# Patient Record
Sex: FEMALE | Employment: UNEMPLOYED | URBAN - METROPOLITAN AREA
[De-identification: names, ages, dates, MRNs, and addresses within clinical notes are randomized per-mention and may not be internally consistent; named-entity substitution may affect disease eponyms.]

---

## 2017-02-15 ENCOUNTER — OFFICE VISIT (OUTPATIENT)
Dept: FAMILY MEDICINE CLINIC | Age: 37
End: 2017-02-15

## 2017-02-15 VITALS
SYSTOLIC BLOOD PRESSURE: 128 MMHG | DIASTOLIC BLOOD PRESSURE: 88 MMHG | HEART RATE: 115 BPM | HEIGHT: 72 IN | BODY MASS INDEX: 33.81 KG/M2 | RESPIRATION RATE: 18 BRPM | WEIGHT: 249.6 LBS | OXYGEN SATURATION: 96 % | TEMPERATURE: 98.2 F

## 2017-02-15 DIAGNOSIS — Z51.81 MEDICATION MONITORING ENCOUNTER: ICD-10-CM

## 2017-02-15 DIAGNOSIS — M79.7 FIBROMYALGIA: ICD-10-CM

## 2017-02-15 DIAGNOSIS — F41.9 ANXIETY: ICD-10-CM

## 2017-02-15 DIAGNOSIS — J45.41 MODERATE PERSISTENT ASTHMA WITH ACUTE EXACERBATION: Primary | ICD-10-CM

## 2017-02-15 LAB
AMPHETAMINE QL URINE POC: NEGATIVE
BARBITURATES UR POC: NEGATIVE
BENZODIAZEPINES UR POC: NORMAL
COCAINE QL URINE POC: NEGATIVE
LOT EXP DATE POC: NORMAL
LOT NUMBER POC: NORMAL
MARIJUANA (THC) QL URINE POC: NEGATIVE
MDMA/ECSTASY UR POC: NEGATIVE
METHADONE QL URINE POC: NEGATIVE
METHAMPHETAMINE QL URINE POC: NEGATIVE
OPIATES QL URINE POC: NORMAL
OXYCODONE UR POC: NORMAL
VALID INTERNAL CONTROL?: YES

## 2017-02-15 RX ORDER — ALBUTEROL SULFATE 90 UG/1
AEROSOL, METERED RESPIRATORY (INHALATION)
COMMUNITY
End: 2021-03-31

## 2017-02-15 RX ORDER — ALBUTEROL SULFATE 0.83 MG/ML
2.5 SOLUTION RESPIRATORY (INHALATION)
Qty: 24 EACH | Refills: 5 | Status: SHIPPED | OUTPATIENT
Start: 2017-02-15 | End: 2021-03-31

## 2017-02-15 RX ORDER — QUETIAPINE FUMARATE 400 MG/1
500 TABLET, FILM COATED ORAL
COMMUNITY
End: 2021-03-31

## 2017-02-15 RX ORDER — FLUTICASONE PROPIONATE AND SALMETEROL 500; 50 UG/1; UG/1
1 POWDER RESPIRATORY (INHALATION) EVERY 12 HOURS
COMMUNITY
End: 2021-03-31

## 2017-02-15 RX ORDER — QUETIAPINE FUMARATE 200 MG/1
100 TABLET, FILM COATED ORAL 2 TIMES DAILY
COMMUNITY
End: 2021-03-31

## 2017-02-15 RX ORDER — LORAZEPAM 1 MG/1
TABLET ORAL
Qty: 17 TAB | Refills: 0 | Status: SHIPPED | OUTPATIENT
Start: 2017-02-15 | End: 2021-03-31

## 2017-02-15 RX ORDER — LORAZEPAM 2 MG/1
TABLET ORAL
COMMUNITY
End: 2017-02-15

## 2017-02-15 RX ORDER — LIDOCAINE 50 MG/G
PATCH TOPICAL EVERY 24 HOURS
COMMUNITY
End: 2021-03-31

## 2017-02-15 RX ORDER — DOXEPIN HYDROCHLORIDE 50 MG/1
CAPSULE ORAL
COMMUNITY
End: 2021-03-31

## 2017-02-15 NOTE — PROGRESS NOTES
Maria D Nelson is a 39 y.o. female   Chief Complaint   Patient presents with    Establish Care    Wheezing    pt here to establish care. Pt reports wheezing and has a hx of asthyma. Pt currently using ventolin and advair daily. Pt has been needing the ventolin more often used it 6x yesterday. Pt states she used the ventolin approx 1 hr ago. Pt reports getting flares of her asthma at least 1x a month. Pt usually uses neb solution when she has an exac but is out and needs refills. Pt also on ativan 2mg tid and oxy 15mg PO Q4 for patellofemoral syndrome and fibro. Pt has no records with her.  checked and pt has been using same clinician mostly for fills, last Rx for ativan was given by psych and states this was when she was in [facility] for 2 weeks,prior ativan Rx was for 1mg tid. That is the only abnormality. Utox is pos for benzo, opiate and oxy. Spoke with prior treating physician Dr Alicia Perez and states she was negative for benzo multiple times and was discharged from the practice for this. Alos rpeorts that he was trying to decrease her pain meds and pt was being very difficult about this and insisting on staying on current regimen. Chief Complaint   Patient presents with   [address] Wheezing     she is a 39y.o. year old female who presents for evalution. Reviewed PmHx, RxHx, FmHx, SocHx, AllgHx and updated and dated in the chart. Review of Systems - negative except as listed above in the HPI    Objective:     Vitals:    02/15/17 0830   BP: 128/88   Pulse: (!) 115   Resp: 18   Temp: 98.2 °F (36.8 °C)   TempSrc: Oral   SpO2: 96%   Weight: 249 lb 9.6 oz (113.2 kg)   Height: 5' 11.5\" (1.816 m)       Current Outpatient Prescriptions   Medication Sig    doxepin (SINEQUAN) 50 mg capsule Take  by mouth nightly.  QUEtiapine (SEROQUEL) 200 mg tablet Take 200 mg by mouth two (2) times a day.  QUEtiapine (SEROQUEL) 400 mg tablet Take 400 mg by mouth two (2) times a day.     albuterol (PROVENTIL HFA, VENTOLIN HFA, PROAIR HFA) 90 mcg/actuation inhaler Take  by inhalation.  fluticasone-salmeterol (ADVAIR DISKUS) 500-50 mcg/dose diskus inhaler Take 1 Puff by inhalation every twelve (12) hours.  lidocaine (LIDODERM) 5 % by TransDERmal route every twenty-four (24) hours. Apply patch to the affected area for 12 hours a day and remove for 12 hours a day.  albuterol (PROVENTIL VENTOLIN) 2.5 mg /3 mL (0.083 %) nebulizer solution 3 mL by Nebulization route every four (4) hours as needed for Wheezing. 493.92 ICD10    LORazepam (ATIVAN) 1 mg tablet 1 tab Po tid prn withdrawal symptoms x 2 days, 1 tab Po bid x 3 days prn withdrawal symptoms, then 0.5 tab PO bid x 3 days prn withdrawal symptoms, then 0.5 tab daily x 4 days prn withdrawal symptoms.  oxyCODONE IR (OXY-IR) 15 mg immediate release tablet     zolpidem (AMBIEN) 10 mg tablet     omeprazole (PRILOSEC) 10 mg capsule Take 10 mg by mouth daily.  levonorgestrel-ethinyl estradiol (ALTAVERA, 28,) 0.15-0.03 mg per tablet 1 pill daily, active pills only.  clonazePAM (KLONOPIN) 0.5 mg tablet     mirtazapine (REMERON) 15 mg tablet     promethazine (PHENERGAN) 6.25 mg/5 mL syrup     valACYclovir (VALTREX) 500 mg tablet     lisinopril (PRINIVIL, ZESTRIL) 10 mg tablet Take  by mouth daily. No current facility-administered medications for this visit.         Physical Examination: General appearance - alert, well appearing, and in no distress, does not appear in pain  Mental status - alert, oriented to person, place, and time  Eyes - pupils equal and reactive, extraocular eye movements intact  Chest - clear to auscultation, no wheezes, rales or rhonchi, symmetric air entry  Heart - normal rate, regular rhythm, normal S1, S2, no murmurs, rubs, clicks or gallops  Extremities - peripheral pulses normal, no pedal edema, no clubbing or cyanosis  Skin - normal coloration and turgor, no rashes, no suspicious skin lesions noted Assessment/ Plan:   Indu was seen today for establish care and wheezing. Diagnoses and all orders for this visit:    Moderate persistent asthma with acute exacerbation  -     albuterol (PROVENTIL VENTOLIN) 2.5 mg /3 mL (0.083 %) nebulizer solution; 3 mL by Nebulization route every four (4) hours as needed for Wheezing. 493.92 ICD10    Anxiety  -     LORazepam (ATIVAN) 1 mg tablet; 1 tab Po tid prn withdrawal symptoms x 2 days, 1 tab Po bid x 3 days prn withdrawal symptoms, then 0.5 tab PO bid x 3 days prn withdrawal symptoms, then 0.5 tab daily x 4 days prn withdrawal symptoms. Medication monitoring encounter  -     AMB POC ALESURJIT ICUP DX DRUG SCREEN 10    Fibromyalgia  -     REFERRAL TO PAIN MANAGEMENT     discussed s/s associated with ativan withdrawal and advised to go to ED if symoptoms worsening not improving. For acute detox. Pt advised that I will not be Rx'ing her narcotics recommended f/u with psych for her anxiety  Follow-up Disposition:  Return if symptoms worsen or fail to improve. I have discussed the diagnosis with the patient and the intended plan as seen in the above orders. The patient has received an after-visit summary and questions were answered concerning future plans. Pt conveyed understanding of plan. Medication Side Effects and Warnings were discussed with patient      Janann Gitelman Lobb, DO   Over 50% of the 45 minutes face to face with Indu Kenia Castillo consisted of counseling and/or discussing treatment plans in reference to her controlled meds and medical issues.

## 2017-02-15 NOTE — MR AVS SNAPSHOT
Visit Information Date & Time Provider Department Dept. Phone Encounter #  
 2/15/2017  8:30 AM Rosyjean Dixie, [address] [phone] [id number] Follow-up Instructions Return if symptoms worsen or fail to improve. Upcoming Health Maintenance Date Due DTaP/Tdap/Td series (1 - Tdap) 9/9/2000 INFLUENZA AGE 9 TO ADULT 8/1/2016 PAP AKA CERVICAL CYTOLOGY 11/25/2018 Allergies as of 2/15/2017  Review Complete On: 2/15/2017 By: Patricia Colin DO Severity Noted Reaction Type Reactions Nsaids (Non-steroidal Anti-inflammatory Drug)  02/15/2017    Other (comments) Causes ulcers- states she was told by GI to list as allergy Current Immunizations  Never Reviewed No immunizations on file. Not reviewed this visit You Were Diagnosed With   
  
 Codes Comments Moderate persistent asthma with acute exacerbation    -  Primary ICD-10-CM: J45.41 
ICD-9-CM: 493.92 Anxiety     ICD-10-CM: F41.9 ICD-9-CM: 300.00 Medication monitoring encounter     ICD-10-CM: Z51.81 
ICD-9-CM: V58.83 Fibromyalgia     ICD-10-CM: M79.7 ICD-9-CM: 729.1 Vitals BP Pulse Temp Resp Height(growth percentile) Weight(growth percentile) 128/88 (!) 115 98.2 °F (36.8 °C) (Oral) 18 5' 11.5\" (1.816 m) 249 lb 9.6 oz (113.2 kg) LMP SpO2 BMI OB Status Smoking Status 01/15/2017 (Approximate) 96% 34.33 kg/m2 Having regular periods Former Smoker Vitals History BMI and BSA Data Body Mass Index Body Surface Area  
 34.33 kg/m 2 2.39 m 2 Preferred Pharmacy Pharmacy Name Phone [id number], [address] [phone] Your Updated Medication List  
  
   
This list is accurate as of: 2/15/17  9:29 AM.  Always use your most recent med list.  
  
  
  
  
 ADVAIR DISKUS 500-50 mcg/dose diskus inhaler Generic drug:  fluticasone-salmeterol Take 1 Puff by inhalation every twelve (12) hours. * albuterol 90 mcg/actuation inhaler Commonly known as:  PROVENTIL HFA, VENTOLIN HFA, PROAIR HFA Take  by inhalation. * albuterol 2.5 mg /3 mL (0.083 %) nebulizer solution Commonly known as:  PROVENTIL VENTOLIN  
3 mL by Nebulization route every four (4) hours as needed for Wheezing. 493.92 ICD10  
  
 clonazePAM 0.5 mg tablet Commonly known as:  KlonoPIN  
  
 doxepin 50 mg capsule Commonly known as:  SINEquan Take  by mouth nightly. levonorgestrel-ethinyl estradiol 0.15-0.03 mg Tab Commonly known as:  ALTAVERA (28) 1 pill daily, active pills only. lidocaine 5 % Commonly known as:  LIDODERM  
by TransDERmal route every twenty-four (24) hours. Apply patch to the affected area for 12 hours a day and remove for 12 hours a day. lisinopril 10 mg tablet Commonly known as:  Janet Lara Take  by mouth daily. LORazepam 1 mg tablet Commonly known as:  ATIVAN  
1 tab Po tid prn withdrawal symptoms x 2 days, 1 tab Po bid x 3 days prn withdrawal symptoms, then 0.5 tab PO bid x 3 days prn withdrawal symptoms, then 0.5 tab daily x 4 days prn withdrawal symptoms. mirtazapine 15 mg tablet Commonly known as:  REMERON  
  
 omeprazole 10 mg capsule Commonly known as:  PRILOSEC Take 10 mg by mouth daily. oxyCODONE IR 15 mg immediate release tablet Commonly known as:  OXY-IR  
  
 promethazine 6.25 mg/5 mL syrup Commonly known as:  PHENERGAN  
  
 * SEROquel 200 mg tablet Generic drug:  QUEtiapine Take 200 mg by mouth two (2) times a day. * SEROquel 400 mg tablet Generic drug:  QUEtiapine Take 400 mg by mouth two (2) times a day. valACYclovir 500 mg tablet Commonly known as:  VALTREX  
  
 zolpidem 10 mg tablet Commonly known as:  AMBIEN  
  
 * Notice:   This list has 4 medication(s) that are the same as other medications prescribed for you. Read the directions carefully, and ask your doctor or other care provider to review them with you. Prescriptions Printed Refills LORazepam (ATIVAN) 1 mg tablet 0 Si tab Po tid prn withdrawal symptoms x 2 days, 1 tab Po bid x 3 days prn withdrawal symptoms, then 0.5 tab PO bid x 3 days prn withdrawal symptoms, then 0.5 tab daily x 4 days prn withdrawal symptoms. Class: Print Prescriptions Sent to Pharmacy Refills  
 albuterol (PROVENTIL VENTOLIN) 2.5 mg /3 mL (0.083 %) nebulizer solution 5 Sig: 3 mL by Nebulization route every four (4) hours as needed for Wheezing. 493.92 ICD10 Class: Normal  
 Pharmacy: [facility], [address] Ph #: [phone] Route: Nebulization We Performed the Following AMB POC ALERE ICUP DX DRUG SCREEN 10 [91191 CPT(R)] REFERRAL TO PAIN MANAGEMENT [[id number] Custom] Comments:  
 Please evaluate patient for fibromyalgia. Follow-up Instructions Return if symptoms worsen or fail to improve. Referral Information Referral ID Referred By Referred To  
  
 [id number] Charlie HOWARD Not Available Visits Status Start Date End Date 1 New Request 2/15/17 2/15/18 If your referral has a status of pending review or denied, additional information will be sent to support the outcome of this decision. Patient Instructions Lorazepam (By mouth) Lorazepam ([id number]) Treats anxiety. Brand Name(s):Ativan, LORazepam Intensol There may be other brand names for this medicine. When This Medicine Should Not Be Used: This medicine is not right for everyone. Do not use it if you had an allergic reaction to lorazepam or similar medicines, or if you have acute narrow-angle glaucoma. How to Use This Medicine:  
Tablet, Liquid · Take your medicine as directed. Your dose may need to be changed several times to find what works best for you. · Measure the oral liquid medicine with a marked measuring spoon, oral syringe, or medicine cup. · Mix the oral solution with water, juice, soda, applesauce, or pudding. Drink or eat the mixture right away. Do not store it for later use. · Missed dose: Take a dose as soon as you remember. If you are more than 1 hour late, skip the missed dose and wait until it is time for your next dose. Do not use extra medicine to make up for a missed dose. · Tablets: Store the medicine in a closed container at room temperature, away from heat, moisture, and direct light. · Oral solution: Refrigerate the oral solution. Throw away an opened bottle after 90 days. Drugs and Foods to Avoid: Ask your doctor or pharmacist before using any other medicine, including over-the-counter medicines, vitamins, and herbal products. · Some medicines can affect how lorazepam works. Tell your doctor if you are also using any of the following: ¨ Theophylline, aminophylline ¨ Clozapine ¨ Probenecid ¨ Valproate ¨ Medicine to treat seizures ¨ Medicine to treat depression or mental illness · Tell your doctor if you use anything else that makes you sleepy. Some examples are allergy medicine, narcotic pain medicine, and alcohol. Warnings While Using This Medicine: · Tell your doctor if you are pregnant or breastfeeding, or if you have glaucoma, liver disease, lung problems, or a history of depression, alcohol or drug addiction, or seizures. · This medicine can be habit-forming. Do not use more than your prescribed dose. Call your doctor if you think your medicine is not working. · Do not stop using this medicine suddenly. Your doctor will need to slowly decrease your dose before you stop it completely. · This medicine may make you drowsy. Do not drive or do anything else that could be dangerous until you know how this medicine affects you.  
· Your doctor will check your progress and the effects of this medicine at regular visits. Keep all appointments. · Keep all medicine out of the reach of children. Never share your medicine with anyone. Possible Side Effects While Using This Medicine:  
Call your doctor right away if you notice any of these side effects: 
· Depression, confusion, thoughts of hurting yourself · Severe drowsiness or weakness, slow heartbeat, trouble breathing If you notice these less serious side effects, talk with your doctor: · Dizziness, clumsiness · Unusual tiredness If you notice other side effects that you think are caused by this medicine, tell your doctor. Call your doctor for medical advice about side effects. You may report side effects to FDA at 8-951-WFI-7649 © 2016 [address] is for End User's use only and may not be sold, redistributed or otherwise used for commercial purposes. The above information is an  only. It is not intended as medical advice for individual conditions or treatments. Talk to your doctor, nurse or pharmacist before following any medical regimen to see if it is safe and effective for you. Introducing [address]! Lauro Castro introduces [employer] patient portal. Now you can access parts of your medical record, email your doctor's office, and request medication refills online. 1. In your internet browser, go to [email or web address] 2. Click on the First Time User? Click Here link in the Sign In box. You will see the New Member Sign Up page. 3. Enter your [employer] Access Code exactly as it appears below. You will not need to use this code after youve completed the sign-up process. If you do not sign up before the expiration date, you must request a new code. · [employer] Access Code: [email or web address] Expires: 5/16/2017  9:28 AM 
 
4. Enter the last four digits of your Social Security Number (xxxx) and Date of Birth (mm/dd/yyyy) as indicated and click Submit.  You will be taken to the next sign-up page. 5. Create a [employer] ID. This will be your [employer] login ID and cannot be changed, so think of one that is secure and easy to remember. 6. Create a [employer] password. You can change your password at any time. 7. Enter your Password Reset Question and Answer. This can be used at a later time if you forget your password. 8. Enter your e-mail address. You will receive e-mail notification when new information is available in [address]. 9. Click Sign Up. You can now view and download portions of your medical record. 10. Click the Download Summary menu link to download a portable copy of your medical information. If you have questions, please visit the Frequently Asked Questions section of the [employer] website. Remember, [employer] is NOT to be used for urgent needs. For medical emergencies, dial 911. Now available from your iPhone and Android! Please provide this summary of care documentation to your next provider. Your primary care clinician is listed as Abner Lucas. If you have any questions after today's visit, please call [phone].

## 2017-03-22 ENCOUNTER — DOCUMENTATION ONLY (OUTPATIENT)
Dept: FAMILY MEDICINE CLINIC | Age: 37
End: 2017-03-22

## 2017-03-22 NOTE — PROGRESS NOTES
Records from Dr Clarita Scheuermann was signed by PCP and sent to central scanning to be scanned into chart.

## 2017-04-06 ENCOUNTER — HOSPITAL ENCOUNTER (OUTPATIENT)
Dept: GENERAL RADIOLOGY | Age: 37
Discharge: HOME | End: 2017-04-06
Payer: MEDICAID

## 2017-04-06 DIAGNOSIS — M17.0 BILATERAL PRIMARY OSTEOARTHRITIS OF KNEE: ICD-10-CM

## 2017-04-06 DIAGNOSIS — M47.816 OSTEOARTHRITIS OF FACET JOINT OF LUMBAR SPINE: ICD-10-CM

## 2017-04-06 PROCEDURE — 72100 X-RAY EXAM L-S SPINE 2/3 VWS: CPT

## 2017-04-06 PROCEDURE — 73562 X-RAY EXAM OF KNEE 3: CPT

## 2021-03-30 ENCOUNTER — DOCUMENTATION ONLY (OUTPATIENT)
Dept: FAMILY MEDICINE CLINIC | Age: 41
End: 2021-03-30

## 2021-03-30 NOTE — PROGRESS NOTES
[facility] [place] of [place] was put on Dr Pako Hamlin desk to review. Patient has been scheduled for a np re-est appt on 03/31/21 with Dr Bryan Rose.

## 2021-03-31 ENCOUNTER — OFFICE VISIT (OUTPATIENT)
Dept: FAMILY MEDICINE CLINIC | Age: 41
End: 2021-03-31
Payer: MEDICAID

## 2021-03-31 VITALS
SYSTOLIC BLOOD PRESSURE: 126 MMHG | OXYGEN SATURATION: 95 % | WEIGHT: 293 LBS | HEIGHT: 72 IN | TEMPERATURE: 97.8 F | DIASTOLIC BLOOD PRESSURE: 79 MMHG | BODY MASS INDEX: 39.68 KG/M2 | HEART RATE: 104 BPM

## 2021-03-31 DIAGNOSIS — K21.9 GASTROESOPHAGEAL REFLUX DISEASE, UNSPECIFIED WHETHER ESOPHAGITIS PRESENT: ICD-10-CM

## 2021-03-31 DIAGNOSIS — Z51.81 MEDICATION MONITORING ENCOUNTER: ICD-10-CM

## 2021-03-31 DIAGNOSIS — R60.9 ASYMMETRIC EDEMA OF BOTH LOWER EXTREMITIES: Primary | ICD-10-CM

## 2021-03-31 DIAGNOSIS — M79.7 FIBROMYALGIA: ICD-10-CM

## 2021-03-31 DIAGNOSIS — E03.9 ACQUIRED HYPOTHYROIDISM: ICD-10-CM

## 2021-03-31 DIAGNOSIS — E66.01 CLASS 3 SEVERE OBESITY WITH SERIOUS COMORBIDITY AND BODY MASS INDEX (BMI) OF 40.0 TO 44.9 IN ADULT, UNSPECIFIED OBESITY TYPE (HCC): ICD-10-CM

## 2021-03-31 DIAGNOSIS — R00.0 TACHYCARDIA: ICD-10-CM

## 2021-03-31 DIAGNOSIS — J45.40 MODERATE PERSISTENT ASTHMA WITHOUT COMPLICATION: ICD-10-CM

## 2021-03-31 LAB
ALBUMIN SERPL-MCNC: 3.7 G/DL (ref 3.5–5)
ALBUMIN/GLOB SERPL: 1.1 {RATIO} (ref 1.1–2.2)
ALP SERPL-CCNC: 101 U/L (ref 45–117)
ALT SERPL-CCNC: 71 U/L (ref 12–78)
AMPHETAMINE QL URINE POC: NEGATIVE
ANION GAP SERPL CALC-SCNC: 7 MMOL/L (ref 5–15)
AST SERPL-CCNC: 44 U/L (ref 15–37)
BARBITURATES UR POC: NEGATIVE
BASOPHILS # BLD: 0 K/UL (ref 0–0.1)
BASOPHILS NFR BLD: 0 % (ref 0–1)
BENZODIAZEPINES UR POC: NEGATIVE
BILIRUB SERPL-MCNC: 0.3 MG/DL (ref 0.2–1)
BUN SERPL-MCNC: 13 MG/DL (ref 6–20)
BUN/CREAT SERPL: 16 (ref 12–20)
CALCIUM SERPL-MCNC: 9.6 MG/DL (ref 8.5–10.1)
CHLORIDE SERPL-SCNC: 105 MMOL/L (ref 97–108)
CO2 SERPL-SCNC: 27 MMOL/L (ref 21–32)
COCAINE QL URINE POC: NEGATIVE
CREAT SERPL-MCNC: 0.83 MG/DL (ref 0.55–1.02)
DIFFERENTIAL METHOD BLD: NORMAL
EOSINOPHIL # BLD: 0.2 K/UL (ref 0–0.4)
EOSINOPHIL NFR BLD: 2 % (ref 0–7)
ERYTHROCYTE [DISTWIDTH] IN BLOOD BY AUTOMATED COUNT: 13.1 % (ref 11.5–14.5)
GLOBULIN SER CALC-MCNC: 3.5 G/DL (ref 2–4)
GLUCOSE SERPL-MCNC: 102 MG/DL (ref 65–100)
HCT VFR BLD AUTO: 40.6 % (ref 35–47)
HGB BLD-MCNC: 13 G/DL (ref 11.5–16)
IMM GRANULOCYTES # BLD AUTO: 0 K/UL (ref 0–0.04)
IMM GRANULOCYTES NFR BLD AUTO: 0 % (ref 0–0.5)
LOT EXP DATE POC: NORMAL
LOT NUMBER POC: NORMAL
LYMPHOCYTES # BLD: 1.7 K/UL (ref 0.8–3.5)
LYMPHOCYTES NFR BLD: 24 % (ref 12–49)
Lab: POSITIVE
MARIJUANA (THC) QL URINE POC: NEGATIVE
MCH RBC QN AUTO: 28.6 PG (ref 26–34)
MCHC RBC AUTO-ENTMCNC: 32 G/DL (ref 30–36.5)
MCV RBC AUTO: 89.4 FL (ref 80–99)
MDMA/ECSTASY UR POC: NEGATIVE
METHADONE QL URINE POC: NEGATIVE
METHAMPHETAMINE QL URINE POC: NEGATIVE
MONOCYTES # BLD: 0.7 K/UL (ref 0–1)
MONOCYTES NFR BLD: 10 % (ref 5–13)
NEUTS SEG # BLD: 4.5 K/UL (ref 1.8–8)
NEUTS SEG NFR BLD: 64 % (ref 32–75)
NRBC # BLD: 0 K/UL (ref 0–0.01)
NRBC BLD-RTO: 0 PER 100 WBC
NTI OTHER MICRO TRANSPORT 977598: NEGATIVE
OPIATES QL URINE POC: NEGATIVE
OXYCODONE UR POC: NEGATIVE
PLATELET # BLD AUTO: 298 K/UL (ref 150–400)
PMV BLD AUTO: 10.1 FL (ref 8.9–12.9)
POTASSIUM SERPL-SCNC: 4.1 MMOL/L (ref 3.5–5.1)
PROT SERPL-MCNC: 7.2 G/DL (ref 6.4–8.2)
RBC # BLD AUTO: 4.54 M/UL (ref 3.8–5.2)
SODIUM SERPL-SCNC: 139 MMOL/L (ref 136–145)
T4 FREE SERPL-MCNC: 0.6 NG/DL (ref 0.8–1.5)
TSH SERPL DL<=0.05 MIU/L-ACNC: 3.02 UIU/ML (ref 0.36–3.74)
VALID INTERNAL CONTROL?: YES
WBC # BLD AUTO: 7.2 K/UL (ref 3.6–11)

## 2021-03-31 PROCEDURE — 80305 DRUG TEST PRSMV DIR OPT OBS: CPT | Performed by: STUDENT IN AN ORGANIZED HEALTH CARE EDUCATION/TRAINING PROGRAM

## 2021-03-31 PROCEDURE — 99204 OFFICE O/P NEW MOD 45 MIN: CPT | Performed by: STUDENT IN AN ORGANIZED HEALTH CARE EDUCATION/TRAINING PROGRAM

## 2021-03-31 PROCEDURE — 93000 ELECTROCARDIOGRAM COMPLETE: CPT | Performed by: STUDENT IN AN ORGANIZED HEALTH CARE EDUCATION/TRAINING PROGRAM

## 2021-03-31 RX ORDER — HYDROXYZINE 50 MG/1
50 TABLET, FILM COATED ORAL 2 TIMES DAILY
COMMUNITY

## 2021-03-31 RX ORDER — PRAZOSIN HYDROCHLORIDE 2 MG/1
2 CAPSULE ORAL
COMMUNITY

## 2021-03-31 RX ORDER — SERTRALINE HYDROCHLORIDE 100 MG/1
100 TABLET, FILM COATED ORAL DAILY
COMMUNITY

## 2021-03-31 RX ORDER — OMEPRAZOLE 10 MG/1
10 CAPSULE, DELAYED RELEASE ORAL DAILY
Qty: 30 CAP | Refills: 5 | Status: SHIPPED | OUTPATIENT
Start: 2021-03-31

## 2021-03-31 RX ORDER — BUPRENORPHINE AND NALOXONE 8; 2 MG/1; MG/1
FILM, SOLUBLE BUCCAL; SUBLINGUAL
COMMUNITY

## 2021-03-31 RX ORDER — ALBUTEROL SULFATE 0.83 MG/ML
2.5 SOLUTION RESPIRATORY (INHALATION)
Qty: 24 EACH | Refills: 5 | Status: SHIPPED | OUTPATIENT
Start: 2021-03-31

## 2021-03-31 RX ORDER — ALBUTEROL SULFATE 90 UG/1
2 AEROSOL, METERED RESPIRATORY (INHALATION)
Qty: 1 INHALER | Refills: 3 | Status: SHIPPED | OUTPATIENT
Start: 2021-03-31

## 2021-03-31 RX ORDER — GABAPENTIN 300 MG/1
300 CAPSULE ORAL 3 TIMES DAILY
Qty: 90 CAP | Refills: 2 | Status: SHIPPED | OUTPATIENT
Start: 2021-03-31

## 2021-03-31 NOTE — PROGRESS NOTES
Progress Note    she is a 38y.o. year old female who presents for evalution. Subjective:     Chief Complaint   Patient presents with   [address]     previously pt      Concerned about thyroid function. Also attributes weight gain to seroquel  Trouble losing weight  Trying to diet. She states that she isn't eating anything with carbs and trying to eat more protein. \"Metabolic confusion\"  Stopped drinking soft drinks regularly, now one (mtn dew or sprite) 12 oz every few days. Drinking water and sparkling water. Not currently tracking. Morning: oatmeal  Lunch: greek yogurt +/- fruit  Dinner: salad (small amt caesar dressing) or baked chicken or tuna fish w/o bread    Psychiatrist was prescribing gabapentin. Not now that it is controlled  Was taking 300 mg three times/day. On ambien for sleep. Suboxone for pain from psychiatrist for history of oxycodone and trying to be off of opiates due to depression. Told by ortho that she will need to come off of that to go on opioids after surgery. History of alcohol abuse, many years since last drink. Previously got ECT treatments. Jaw popping started while getting those. Reports enlarged heart on sonogram 4-5 years ago. COVID in November. Repeat echo reported as normal at that time. Meseret Thakur. Diagnosed with Hep C 1 year ago. Uncertain how she got it. No IVDU. One sexual partner, one tattoo non-reputable. Not seen liver specialist.    Right foot swelling before weight gain  Burning pain and redness   Usually 4 days. Off and on for the last year. Worried about veins  Left heel throbbing x 2 weeks. Tender at achilles insertion. Concerns for acid reflux. Would like to go back on medication    Previously issues with asthma, reports heat is a trigger. Needs an inhaler    Earache on right side. Comes and goes  Previously got ECT treatments. Jaw popping started while getting those.     Reports needs some teeth pulled    Reviewed PmHx, RxHx, FmHx, SocHx, AllgHx and updated and dated in the chart. Review of Systems - negative except as listed above in the HPI    Objective:     Vitals:    03/31/21 1328   BP: 126/79   Pulse: (!) 104   Temp: 97.8 °F (36.6 °C)   SpO2: 95%   Weight: 297 lb 9.6 oz (135 kg)   Height: 5' 11.5\" (1.816 m)       Current Outpatient Medications   Medication Sig    prazosin (MINIPRESS) 2 mg capsule Take 2 mg by mouth nightly.  sertraline (Zoloft) 100 mg tablet Take 100 mg by mouth daily. Pt reporting 1 1/2 tabs daily    hydrOXYzine HCL (ATARAX) 50 mg tablet Take 50 mg by mouth two (2) times a day.  buprenorphine-naloxone (Suboxone) 8-2 mg film sublingaul film by SubLINGual route Before breakfast, lunch, and dinner. Pt report 1/2 strip TID    omeprazole (PRILOSEC) 10 mg capsule Take 1 Cap by mouth daily.  albuterol (PROVENTIL HFA, VENTOLIN HFA, PROAIR HFA) 90 mcg/actuation inhaler Take 2 Puffs by inhalation every four (4) hours as needed for Wheezing or Shortness of Breath.  albuterol (PROVENTIL VENTOLIN) 2.5 mg /3 mL (0.083 %) nebu 3 mL by Nebulization route every four (4) hours as needed for Wheezing. 493.92 ICD10    gabapentin (NEURONTIN) 300 mg capsule Take 1 Cap by mouth three (3) times daily. Max Daily Amount: 900 mg.    mirtazapine (REMERON) 15 mg tablet     zolpidem (AMBIEN) 10 mg tablet      No current facility-administered medications for this visit. Physical Exam  Vitals signs and nursing note reviewed. Constitutional:       General: She is not in acute distress. Appearance: Normal appearance. She is not ill-appearing, toxic-appearing or diaphoretic. HENT:      Head: Normocephalic and atraumatic. Eyes:      General: No scleral icterus. Right eye: No discharge. Left eye: No discharge. Conjunctiva/sclera: Conjunctivae normal.   Neck:      Musculoskeletal: No neck rigidity.    Cardiovascular:      Rate and Rhythm: Normal rate and regular rhythm. Pulses: Normal pulses. Heart sounds: Normal heart sounds. Pulmonary:      Effort: Pulmonary effort is normal. No respiratory distress. Breath sounds: Normal breath sounds. Musculoskeletal:         General: No tenderness. Right lower leg: Edema present. Left lower leg: Edema present. Skin:     General: Skin is warm and dry. Neurological:      General: No focal deficit present. Mental Status: She is alert. Psychiatric:         Mood and Affect: Mood normal.         Behavior: Behavior normal.         Thought Content: Thought content normal.         Judgment: Judgment normal.            Assessment/ Plan:   Diagnoses and all orders for this visit:    1. Asymmetric edema of both lower extremities  -     DUPLEX LOWER EXT VENOUS BILAT; Future    2. Class 3 severe obesity with serious comorbidity and body mass index (BMI) of 40.0 to 44.9 in adult, unspecified obesity type ([facility] [address]  -     CBC WITH AUTOMATED DIFF; Future  -     METABOLIC PANEL, COMPREHENSIVE; Future    3. Acquired hypothyroidism  -     TSH 3RD GENERATION; Future  -     T4, FREE; Future    4. Medication monitoring encounter  -     AMB POC ALERE ICUP DX DRUG SCREEN 10    5. Gastroesophageal reflux disease, unspecified whether esophagitis present  -     omeprazole (PRILOSEC) 10 mg capsule; Take 1 Cap by mouth daily. 6. Tachycardia  -     AMB POC EKG ROUTINE W/ 12 LEADS, INTER & REP    7. Moderate persistent asthma without complication  -     albuterol (PROVENTIL HFA, VENTOLIN HFA, PROAIR HFA) 90 mcg/actuation inhaler; Take 2 Puffs by inhalation every four (4) hours as needed for Wheezing or Shortness of Breath. -     albuterol (PROVENTIL VENTOLIN) 2.5 mg /3 mL (0.083 %) nebu; 3 mL by Nebulization route every four (4) hours as needed for Wheezing. 493.92 ICD10    8. Fibromyalgia  -     gabapentin (NEURONTIN) 300 mg capsule; Take 1 Cap by mouth three (3) times daily. Max Daily Amount: 900 mg.     Other orders  -     T3 TOTAL       Follow-up and Dispositions    · Return in about 4 weeks (around 4/28/2021) for fasting labs. I have discussed the diagnosis with the patient and the intended plan as seen in the above orders. The patient has received an after-visit summary and questions were answered concerning future plans. Pt conveyed understanding of plan.     Medication Side Effects and Warnings were discussed with patient      Sharlene Dennison MD

## 2021-03-31 NOTE — PROGRESS NOTES
Result reviewed with patient in office. Tricyclic screen was positive, patient not currently supposed to be taking any tricyclic antidepressants so encouraged her to check her old pill bottles at home and ensure she is not accidentally taking old medication. She reports multiple changes in psych medications over the last few months. Rest of UDS negative. Did not expect to see Suboxone (buprenorphine) since it is not often detected by standard UDS.   No other opiates evident

## 2021-03-31 NOTE — PROGRESS NOTES
Dm Rendon is a 38 y.o. female , id x 2(name and ). Reviewed record, history, and  medications. Chief Complaint   Patient presents with   [address]     previously pt        Vitals:    21 1328   BP: 126/79   Pulse: (!) 104   Temp: 97.8 °F (36.6 °C)   SpO2: 95%   Weight: 297 lb 9.6 oz (135 kg)   Height: 5' 11.5\" (1.816 m)   PainSc:   6   PainLoc: Generalized       Coordination of Care Questionnaire:   1) Have you been to an emergency room, urgent care, or hospitalized since your last visit? yes       2. Have seen or consulted any other health care provider since your last visit? YES      3 most recent PHQ Screens 3/31/2021   Little interest or pleasure in doing things Several days   Feeling down, depressed, irritable, or hopeless Several days   Total Score PHQ 2 2       Patient is accompanied by self I have received verbal consent from Dm Rendon to discuss any/all medical information while they are present in the room.

## 2021-03-31 NOTE — PATIENT INSTRUCTIONS
Unflavored plain yogurt, not flavored. Choose the small cans of soda. Or cut out soda for sparkling water. Switch instant oatmeal for plain oatmeal, make your own and light on the sweetener. Watch portions as well as what you are eating. Dr. Rona Friedman or Dr. Junior Montgomery for podiatry (foot doctor) [phone] Temporomandibular Disorder: Care Instructions Your Care Instructions Temporomandibular (TM) disorders are a problem with the muscles and joints that connect your jaw to your skull. They cause pain when you open your mouth, chew, or yawn. You may feel this pain on one or both sides. TM disorders are often caused by tight jaw muscles. The tightness can be caused by clenching or grinding your teeth. This may happen when you have a lot of stress in your life. If you lower your stress, you may be able to stop clenching or grinding your teeth. This will help relax your jaw and reduce your pain. You may also be able to do some things at home to feel better. But if none of this works, your doctor may prescribe medicine to help relax your muscles and control the pain. Follow-up care is a key part of your treatment and safety. Be sure to make and go to all appointments, and call your doctor if you are having problems. It's also a good idea to know your test results and keep a list of the medicines you take. How can you care for yourself at home? · Put a warm, moist cloth or heating pad set on low on your jaw. Do this for 10 to 20 minutes at a time. Put a thin cloth between the heating pad and your skin. · Avoid hard or chewy foods that cause your jaws to work very hard. Examples include popcorn, jerky, tough meats, chewy breads, gum, and raw apples and carrots. · Choose softer foods that are easy to chew. These include eggs, yogurt, and soup. · Cut your food into small pieces. Chew slowly. · If your jaw gets too painful to chew, or if it locks, you may need to puree your food for a few days or weeks.  
· To relax your jaw, repeat this exercise for a few minutes every morning and evening. Watch yourself in a mirror. Gently open and close your mouth. Move your jaw straight up and down. But don't do this if it makes your pain worse. · Get at least 30 minutes of exercise on most days of the week to relieve stress. Walking is a good choice. You also may want to do other activities, such as running, swimming, cycling, or playing tennis or team sports. · Do not: 
? Hold a phone between your shoulder and your jaw. ? Open your mouth all the way, like when you sing loudly or yawn. ? Clench or grind your teeth, bite your lips, or chew your fingernails. ? Clench things such as pens, pipes, or cigars between your teeth. When should you call for help? Call your doctor now or seek immediate medical care if: 
  · Your jaw is locked open or shut or it is hard to move your jaw. Watch closely for changes in your health, and be sure to contact your doctor if: 
  · Your jaw pain gets worse.  
  · Your face is swollen.  
  · You do not get better as expected. Where can you learn more? Go to http://www.gray.com/ Enter L050 in the search box to learn more about \"Temporomandibular Disorder: Care Instructions. \" Current as of: March 25, 2020               Content Version: 12.6 © 7428-7474 [employer]. Care instructions adapted under license by [employer] (which disclaims liability or warranty for this information). If you have questions about a medical condition or this instruction, always ask your healthcare professional. [place] [address] any warranty or liability for your use of this information.

## 2021-04-02 NOTE — PROGRESS NOTES
Pt notified of lab results and recommendations, instructed to repeat labs in 3-6 months. Pt states she has f/u appointment in April and will address any questions she has at that time.

## 2021-04-02 NOTE — PROGRESS NOTES
Attached are the results of your most recent lab work. I have the following recommendations:     1. Your CBC which looks at your white blood cells, red blood cells, and hemoglobin came back looking normal. No sign of infection or anemia.      2. Your blood sugar is mildly elevated, concerning for prediabetes. I recommend focusing on healthy diet and exercise. We should recheck in 3 to 6 months. Your AST, a liver marker, is mildly elevated. I recommend we recheck this with your next labs. Your metabolic panel which looks at your electrolytes, other liver markers, and kidney function looks good otherwise.      3. Your TSH was normal but your T4 came back mildly low. This suggests hypothyroidism, which means low thyroid. I am adding on another test and will be in touch after that results regarding next steps      Some values may be minimally outside the \"normal\" range but are not harmful or clinically significant. Please let me know if you have any questions or concerns regarding these results.  I recommend we repeat fasting labs in 4-6 months

## 2021-04-03 LAB — T3 SERPL-MCNC: 76 NG/DL (ref 71–180)

## 2021-04-06 NOTE — PROGRESS NOTES
Total T3 is normal.  No overt hypothyroidism. I recommend follow-up with endocrinologist if she would like further testing given the low free T4.

## 2021-04-08 ENCOUNTER — HOSPITAL ENCOUNTER (OUTPATIENT)
Dept: VASCULAR SURGERY | Age: 41
Discharge: HOME | End: 2021-04-08
Attending: STUDENT IN AN ORGANIZED HEALTH CARE EDUCATION/TRAINING PROGRAM
Payer: MEDICAID

## 2021-04-08 DIAGNOSIS — R60.9 ASYMMETRIC EDEMA OF BOTH LOWER EXTREMITIES: ICD-10-CM

## 2021-04-08 PROCEDURE — 93970 EXTREMITY STUDY: CPT

## 2021-05-03 NOTE — PROGRESS NOTES
Annual exam ages 40-58    Indu Lincoln is a ,  38 y.o. female WHITE  DECLINED No LMP recorded. , who presents for her annual checkup. LV=11/25/15    Since her last annual GYN exam about three or more years ago,  she has the following changes in her health history:   - needs hip replacements, needs to lose some wt first  - was recently hospitalized with depression    Trying to eat a lot of protein, no carbs    ADDITIONAL CONCERNS:  She is having no significant problems. With regard to the Gardasil vaccine, she is older than the age for which it is FDA approved. Menstrual status:    Her periods are moderate in flow. She is using three to five pads or tampons per day, usually regular and last 26-30 days. She has dysmenorrhea. Uses tylenol, heating pad. Needs to avoid NSAIDs d/t gastric ulcer    She denies premenstrual symptoms. Contraception:    The current method of family planning is none. Sexual history:     reports being sexually active and has had partner(s) who are Male. She reports using the following method of birth control/protection: None. Pap and Mammogram History:    Her most recent Pap smear was normal, HPV was neg, obtained 2015. She does not have a history of abnormal pap smears. The patient had her first mammogram today in the office.         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 OB History    Para Term  AB Living   4 2 2   2 2   SAB TAB Ectopic Molar Multiple Live Births   2         2      # Outcome Date GA Lbr Vinh/2nd Weight Sex Delivery Anes PTL Lv   4 SAB 11              Birth Comments: First trimester SAB, ~5-6wks. No D&C   3 Term 03   7 lb 4 oz (3.289 kg) F VAGINAL DELI EPI  SANTOS   2 SAB 01              Birth Comments: First trimester SAB, no complications   1 Term    7 lb 6 oz (3.345 kg) F VAGINAL DELI EPI  SANTOS       Current Outpatient Medications   Medication Sig Dispense Refill    prazosin (MINIPRESS) 2 mg capsule Take 2 mg by mouth nightly.  sertraline (Zoloft) 100 mg tablet Take 100 mg by mouth daily. Pt reporting 1 1/2 tabs daily      hydrOXYzine HCL (ATARAX) 50 mg tablet Take 50 mg by mouth two (2) times a day.  buprenorphine-naloxone (Suboxone) 8-2 mg film sublingaul film by SubLINGual route Before breakfast, lunch, and dinner. Pt report 1/2 strip TID      omeprazole (PRILOSEC) 10 mg capsule Take 1 Cap by mouth daily. 30 Cap 5    albuterol (PROVENTIL HFA, VENTOLIN HFA, PROAIR HFA) 90 mcg/actuation inhaler Take 2 Puffs by inhalation every four (4) hours as needed for Wheezing or Shortness of Breath. 1 Inhaler 3    albuterol (PROVENTIL VENTOLIN) 2.5 mg /3 mL (0.083 %) nebu 3 mL by Nebulization route every four (4) hours as needed for Wheezing. 493.92 ICD10 24 Each 5    gabapentin (NEURONTIN) 300 mg capsule Take 1 Cap by mouth three (3) times daily.  Max Daily Amount: 900 mg. 90 Cap 2    mirtazapine (REMERON) 15 mg tablet       zolpidem (AMBIEN) 10 mg tablet        Allergies: Nsaids (non-steroidal anti-inflammatory drug)   Social History     Socioeconomic History    Marital status: SINGLE     Spouse name: Not on file    Number of children: Not on file    Years of education: Not on file    Highest education level: Not on file   Occupational History    Not on file   Social Needs    Financial resource strain: Not on file    Food insecurity     Worry: Not on file     Inability: Not on file    Transportation needs     Medical: Not on file     Non-medical: Not on file   Tobacco Use    Smoking status: Former Smoker    Smokeless tobacco: Former User   Substance and Sexual Activity    Alcohol use: No     Alcohol/week: 0.0 standard drinks    Drug use: No    Sexual activity: Yes     Partners: Male     Birth control/protection: None   Lifestyle    Physical activity     Days per week: Not on file     Minutes per session: Not on file    Stress: Not on file   Relationships    Social connections     Talks on phone: Not on file     Gets together: Not on file     Attends [employer] service: Not on file     Active member of club or organization: Not on file     Attends meetings of clubs or organizations: Not on file     Relationship status: Not on file    Intimate partner violence     Fear of current or ex partner: Not on file     Emotionally abused: Not on file     Physically abused: Not on file     Forced sexual activity: Not on file   Other Topics Concern    Not on file   Social History Narrative    Not on file     Tobacco History:  reports that she has quit smoking. She has quit using smokeless tobacco.  Alcohol Abuse:  reports no history of alcohol use. Drug Abuse:  reports no history of drug use. There is no problem list on file for this patient.     Family History   Problem Relation Age of Onset    Hypertension Mother     Arthritis-osteo Mother     Other Mother         Fibromyalgia    Hypertension Father     Depression Brother     Anxiety Brother     Anxiety Paternal Uncle     Depression Paternal Uncle     Hypertension Maternal Grandmother     Anxiety Maternal Grandmother     Depression Maternal Grandmother     Arthritis-osteo Maternal Grandmother     Hypertension Maternal Grandfather     Anxiety Maternal Grandfather     Depression Maternal Grandfather     Hypertension Paternal Grandmother     Anxiety Paternal Grandmother     Depression Paternal Grandmother     Hypertension Paternal Grandfather     Anxiety Paternal Grandfather     Depression Paternal Grandfather        Review of Systems - History obtained from the patient  Constitutional: negative for weight loss, fever, night sweats  HEENT: negative for hearing loss, earache, congestion, snoring, sorethroat  CV: negative for chest pain, palpitations, edema  Resp: negative for cough, shortness of breath, wheezing  GI: negative for change in bowel habits, abdominal pain, black or bloody stools  : negative for frequency, dysuria, hematuria, vaginal discharge  MSK: negative for back pain, joint pain, muscle pain  Breast: negative for breast lumps, nipple discharge, galactorrhea  Skin :negative for itching, rash, hives  Neuro: negative for dizziness, headache, confusion, weakness  Psych: negative for anxiety, depression, change in mood  Heme/lymph: negative for bleeding, bruising, pallor    Physical Exam    Visit Vitals  /82   Pulse (!) 102   Wt 299 lb (135.6 kg)   BMI 41.12 kg/m²       Constitutional  · Appearance: well-nourished, well developed, alert, in no acute distress    HENT  · Head and Face: appears normal    Neck  · Inspection/Palpation: normal appearance, no masses or tenderness  · Lymph Nodes: no lymphadenopathy present  · Thyroid: gland size normal, nontender, no nodules or masses present on palpation    Chest  · Respiratory Effort: breathing unlabored  · Auscultation: normal breath sounds    Cardiovascular  · Heart:  · Auscultation: regular rate and rhythm without murmur    Breasts  · Inspection of Breasts: breasts symmetrical, no skin changes, no discharge present, nipple appearance normal, no skin retraction present  · Palpation of Breasts and Axillae: no masses present on palpation, no breast tenderness  · Axillary Lymph Nodes: no lymphadenopathy present    Gastrointestinal  · Abdominal Examination: abdomen non-tender to palpation, normal bowel sounds, no masses present  · Liver and spleen: no hepatomegaly present, spleen not palpable  · Hernias: no hernias identified    Genitourinary  · External Genitalia: normal appearance for age, no discharge present, no tenderness present, no inflammatory lesions present, no masses present, no atrophy present  · Vagina: normal vaginal vault without central or paravaginal defects, no discharge present, no inflammatory lesions present, no masses present  · Bladder: non-tender to palpation  · Urethra: appears normal  · Cervix: normal   · Uterus: NT, [bimanual limited by habitus, no abnormalities appreciated  · Adnexa: no adnexal tenderness present, no adnexal masses appreciated [bimanual limited by habitus, no abnormalities appreciated]  · Perineum: perineum within normal limits, no evidence of trauma, no rashes or skin lesions present  · Anus: anus within normal limits, no hemorrhoids present  · Inguinal Lymph Nodes: no lymphadenopathy present    Skin  · General Inspection: no rash, no lesions identified    Neurologic/Psychiatric  · Mental Status:  · Orientation: grossly oriented to person, place and time  · Mood and Affect: mood normal, affect appropriate    Results for orders placed or performed during the hospital encounter of 05/04/21   [facility] MAMMO BI SCREENING INCL CAD    Narrative    STUDY: Bilateral digital screening mammogram    INDICATION:  Screening. COMPARISON: None. Baseline. BREAST COMPOSITION:  The breasts are heterogeneously dense, which may obscure  small masses. FINDINGS: Bilateral digital screening mammography was performed and is  interpreted in conjunction with a computer assisted detection (CAD) system. No  suspicious masses or calcifications are identified. Impression    BI-RADS 1: Negative.  No mammographic evidence of malignancy. RECOMMENDATIONS:  Next screening mammogram is recommended in one year. The patient will be notified of these results. Assessment & Plan:  · Routine gynecologic examination. Pap/HPV today. · Dysmenorrhea. Exam limited by habitus. Will schedule pelvic US  · Counseled re: diet, exercise, healthy lifestyle  · Return for yearly wellness visits  · Rec annual mammogram.  BR-1  · Patient verbalized understanding           Orders Placed This Encounter    PAP IG, APTIMA HPV AND RFX 16/18,45 ([id number])     Standing Status:   Future     Number of Occurrences:   1     Standing Expiration Date:   5/4/2022     Order Specific Question:   Pap Source? Answer:   Cervical and Endocervical     Order Specific Question:   Total Hysterectomy? Answer:   No     Order Specific Question:   Supracervical Hysterectomy? Answer:   No     Order Specific Question:   Post Menopausal?     Answer:   No     Order Specific Question:   Hormone Therapy? Answer:   No     Order Specific Question:   IUD? Answer:   No     Order Specific Question:   Abnormal Bleeding? Answer:   No     Order Specific Question:   Pregnant     Answer:   No     Order Specific Question:   Post Partum? Answer:    No

## 2021-05-04 ENCOUNTER — OFFICE VISIT (OUTPATIENT)
Dept: OBGYN CLINIC | Age: 41
End: 2021-05-04

## 2021-05-04 VITALS
WEIGHT: 293 LBS | BODY MASS INDEX: 41.12 KG/M2 | DIASTOLIC BLOOD PRESSURE: 82 MMHG | SYSTOLIC BLOOD PRESSURE: 129 MMHG | HEART RATE: 102 BPM

## 2021-05-04 DIAGNOSIS — N94.6 DYSMENORRHEA: ICD-10-CM

## 2021-05-04 DIAGNOSIS — Z12.4 PAP SMEAR FOR CERVICAL CANCER SCREENING: ICD-10-CM

## 2021-05-04 DIAGNOSIS — Z01.419 ENCOUNTER FOR WELL WOMAN EXAM WITH ROUTINE GYNECOLOGICAL EXAM: Primary | ICD-10-CM

## 2021-05-04 PROCEDURE — 99386 PREV VISIT NEW AGE 40-64: CPT | Performed by: OBSTETRICS & GYNECOLOGY

## 2021-05-04 NOTE — PATIENT INSTRUCTIONS
Starting a Weight Loss Plan: Care Instructions Overview If you're thinking about losing weight, it can be hard to know where to start. Your doctor can help you set up a weight loss plan that best meets your needs. You may want to take a class on nutrition or exercise, or you could join a weight loss support group. If you have questions about how to make changes to your eating or exercise habits, ask your doctor about seeing a registered dietitian or an exercise specialist. 
It can be a big challenge to lose weight. But you don't have to make huge changes at once. Make small changes, and stick with them. When those changes become habit, add a few more changes. If you don't think you're ready to make changes right now, try to pick a date in the future. Make an appointment to see your doctor to discuss whether the time is right for you to start a plan. Follow-up care is a key part of your treatment and safety. Be sure to make and go to all appointments, and call your doctor if you are having problems. It's also a good idea to know your test results and keep a list of the medicines you take. How can you care for yourself at home? · Set realistic goals. Many people expect to lose much more weight than is likely. A weight loss of 5% to 10% of your body weight may be enough to improve your health. · Get family and friends involved to provide support. Talk to them about why you are trying to lose weight, and ask them to help. They can help by participating in exercise and having meals with you, even if they may be eating something different. · Find what works best for you. If you do not have time or do not like to cook, a program that offers meal replacement bars or shakes may be better for you. Or if you like to prepare meals, finding a plan that includes daily menus and recipes may be best. 
· Ask your doctor about other health professionals who can help you achieve your weight loss goals.  
? A dietitian can help you make healthy changes in your diet. ? An exercise specialist or  can help you develop a safe and effective exercise program. 
? A counselor or psychiatrist can help you cope with issues such as depression, anxiety, or family problems that can make it hard to focus on weight loss. · Consider joining a support group for people who are trying to lose weight. Your doctor can suggest groups in your area. Where can you learn more? Go to http://www.gray.com/ Enter G104 in the search box to learn more about \"Starting a Weight Loss Plan: Care Instructions. \" Current as of: September 23, 2020               Content Version: 12.8 © 8535-5137 [employer]. Care instructions adapted under license by [email or web address] (which disclaims liability or warranty for this information). If you have questions about a medical condition or this instruction, always ask your healthcare professional. [place] [address] any warranty or liability for your use of this information. [address] [place] [address] Weight Loss: 
[phone] 
[email or web address]/ 
 
 
[facility] [email or web address] 
[phone] [employer]. com Whole 30 Weight Watchers Mediterranean Diet \"whole food plant based diet\" - if you google this, a lot of sites will come up

## 2021-05-08 LAB
CYTOLOGIST CVX/VAG CYTO: NORMAL
CYTOLOGY CVX/VAG DOC CYTO: NORMAL
CYTOLOGY CVX/VAG DOC THIN PREP: NORMAL
CYTOLOGY HISTORY:: NORMAL
DX ICD CODE: NORMAL
HPV I/H RISK 4 DNA CVX QL PROBE+SIG AMP: NEGATIVE
Lab: NORMAL
OTHER STN SPEC: NORMAL
STAT OF ADQ CVX/VAG CYTO-IMP: NORMAL

## 2021-05-19 NOTE — PROGRESS NOTES
Problem Visit-Limited    Chief Complaint   Menstrual Problem      HPI  Boby Nur is a 38 y.o. female who presents for the evaluation of dysmenorrhea. Patient's last menstrual period was 05/17/2021. Periods heavy, cramping. On period today. Also with menstrual migraines. HA only with her cycles, no visual changes, just some photphobia. Was on OCPs many years ago for these HA, did well. Wondering about resuming. Is having a lot of pain today with HA, jaw pain and hip pain. Plan is for hip replacement, hoping to schedule surgery soon. US today wnl. Nl uterus. Ovaries not seen d/t obscuring bowel, no evidence of ovarian cysts, adnexal masses. Ultrasound followup    Boby Nur is a 38 y.o. female is here today to review the results of her ultrasound evaluation. Her U/S evaluation is performed because of a previous encounter revealing dysmenorrhea which was identified 2 weeks ago. She reports her periods are heavy and painful with migraines. She is here for an initial ultrasound study. The sonogram: DIFFICULT SCAN DUE TO PATIENT BODY HABITUS. TA AND TV SCANS PERFORMED FOR BEST VISUALIZATION. UTERUS IS ANTEVERTED, NORMAL IN SIZE AND ECHOGENICITY. ENDOMETRIUM MEASURES 2-3MM IN THICKNESS. LIMITED VISUALIZATION OF THE ENDOMETRIUM DUE TO  BOWEL GAS. UNABLE TO VISUALIZE THE RIGHT AND LEFT OVARIES DUE TO BOWEL GAS. THE RIGHT AND LEFT ADNEXAS  APPEAR WNL. NO FREE FLUID SEEN IN THE CDS. .    See detailed report for more information.          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 Social History     Occupational History    Not on file   Tobacco Use    Smoking status: Former Smoker    Smokeless tobacco: Former User   Substance and Sexual Activity    Alcohol use: No     Alcohol/week: 0.0 standard drinks    Drug use: No    Sexual activity: Yes     Partners: Male     Birth control/protection: None     Family History   Problem Relation Age of Onset    Hypertension Mother     Arthritis-osteo Mother     Other Mother         Fibromyalgia    Hypertension Father     Depression Brother     Anxiety Brother     Anxiety Paternal Uncle     Depression Paternal Uncle     Hypertension Maternal Grandmother     Anxiety Maternal Grandmother     Depression Maternal Grandmother     Arthritis-osteo Maternal Grandmother     Hypertension Maternal Grandfather     Anxiety Maternal Grandfather     Depression Maternal Grandfather     Hypertension Paternal Grandmother     Anxiety Paternal Grandmother     Depression Paternal Grandmother     Hypertension Paternal Grandfather     Anxiety Paternal Grandfather     Depression Paternal Grandfather        Allergies   Allergen Reactions    Nsaids (Non-Steroidal Anti-Inflammatory Drug) Other (comments)     Causes ulcers- states she was told by GI to list as allergy     Prior to Admission medications    Medication Sig Start Date End Date Taking?  Authorizing Provider   hydrOXYzine pamoate (VISTARIL) 50 mg capsule  5/10/21  Yes Provider, Historical   QUEtiapine (SEROquel) 200 mg tablet TAKE 1/2 TABLET BY MOUTH AT 8 AM & 2 PM AND TAKE 2 & 1/2 TABLETS AT BEDTIME 5/3/21  Yes Provider, Historical   mirtazapine (REMERON) 30 mg tablet  5/5/21  Yes Provider, Historical   prazosin (MINIPRESS) 2 mg capsule Take 2 mg by mouth nightly. Yes Provider, Historical   sertraline (Zoloft) 100 mg tablet Take 100 mg by mouth daily. Pt reporting 1 1/2 tabs daily   Yes Provider, Historical   hydrOXYzine HCL (ATARAX) 50 mg tablet Take 50 mg by mouth two (2) times a day. Yes Provider, Historical   buprenorphine-naloxone (Suboxone) 8-2 mg film sublingaul film by SubLINGual route Before breakfast, lunch, and dinner. Pt report 1/2 strip TID   Yes Provider, Historical   omeprazole (PRILOSEC) 10 mg capsule Take 1 Cap by mouth daily. 3/31/21  Yes Parris Favre, MD   albuterol (PROVENTIL HFA, VENTOLIN HFA, PROAIR HFA) 90 mcg/actuation inhaler Take 2 Puffs by inhalation every four (4) hours as needed for Wheezing or Shortness of Breath. 3/31/21  Yes Parris Favre, MD   albuterol (PROVENTIL VENTOLIN) 2.5 mg /3 mL (0.083 %) nebu 3 mL by Nebulization route every four (4) hours as needed for Wheezing. 493.92 ICD10 3/31/21  Yes Parris Favre, MD   gabapentin (NEURONTIN) 300 mg capsule Take 1 Cap by mouth three (3) times daily.  Max Daily Amount: 900 mg. 3/31/21  Yes Parris Favre, MD   zolpidem (AMBIEN) 10 mg tablet  3/13/14  Yes Provider, Historical   mirtazapine (REMERON) 15 mg tablet  3/14/14   Provider, Historical        Review of Systems: History obtained from the patient  Constitutional: negative for weight loss, fever, night sweats  Breast: negative for breast lumps, nipple discharge, galactorrhea  GI: negative for change in bowel habits, abdominal pain, black or bloody stools  : negative for frequency, dysuria, hematuria, vaginal discharge  MSK: negative for back pain, joint pain, muscle pain  Skin: negative for itching, rash, hives  Psych: negative for anxiety, depression, change in mood      Objective:  Visit Vitals  /86   Pulse 89   Wt 282 lb (127.9 kg)   LMP 05/17/2021   BMI 38.78 kg/m²       Physical Exam:     Constitutional  · Appearance: well-nourished, well developed, alert, in no acute distress      Skin  · General Inspection: no rash, no lesions identified    Neurologic/Psychiatric  · Mental Status:  · Orientation: grossly oriented to person, place and time  · Mood and Affect: mood normal, affect appropriate    Assessment:  Menstrual Ha  Dysmenorrhea  Menorrhagia  BP borderline today. Pt attributes to pain. Planning on hip replacement      Plan:   Discussed with pt that ANIBAL could aggravate BP (would need to monitor closely). Ortho may not want her on COCs around surgery d/t VTE risk. Will try Slynd. [facility]. If not covered, then can reconsider ANIBAL. Discussed IUD for cycle control, but would not help with menstrual HA. Could consider adding low-dose estrogen patch for this. She is not interested in IUD at this time. Orders Placed This Encounter    drospirenone, contraceptive, (Slynd) 4 mg (28) tab     Sig: Take 1 Tablet by mouth daily. BIN#: [id number]. PCN#: [id number]. GRP#: Bibiana Parker. ID#: [id number].      Dispense:  3 Package     Refill:  4     Can send to [place]

## 2021-05-20 ENCOUNTER — OFFICE VISIT (OUTPATIENT)
Dept: OBGYN CLINIC | Age: 41
End: 2021-05-20

## 2021-05-20 VITALS
WEIGHT: 282 LBS | HEART RATE: 89 BPM | SYSTOLIC BLOOD PRESSURE: 139 MMHG | BODY MASS INDEX: 38.78 KG/M2 | DIASTOLIC BLOOD PRESSURE: 86 MMHG

## 2021-05-20 DIAGNOSIS — N94.6 DYSMENORRHEA: Primary | ICD-10-CM

## 2021-05-20 PROCEDURE — 99204 OFFICE O/P NEW MOD 45 MIN: CPT | Performed by: OBSTETRICS & GYNECOLOGY

## 2021-05-20 RX ORDER — MIRTAZAPINE 30 MG/1
TABLET, FILM COATED ORAL
COMMUNITY
Start: 2021-05-05

## 2021-05-20 RX ORDER — HYDROXYZINE PAMOATE 50 MG/1
CAPSULE ORAL
COMMUNITY
Start: 2021-05-10

## 2021-05-20 RX ORDER — DROSPIRENONE 4 MG/1
1 TABLET, FILM COATED ORAL DAILY
Qty: 3 PACKAGE | Refills: 4 | Status: SHIPPED | OUTPATIENT
Start: 2021-05-20

## 2021-05-20 RX ORDER — QUETIAPINE FUMARATE 200 MG/1
TABLET, FILM COATED ORAL
COMMUNITY
Start: 2021-05-03

## 2021-08-03 PROBLEM — R31.29 OTHER MICROSCOPIC HEMATURIA: Status: ACTIVE | Noted: 2021-08-03

## 2022-03-19 PROBLEM — R31.29 OTHER MICROSCOPIC HEMATURIA: Status: ACTIVE | Noted: 2021-08-03
